# Patient Record
Sex: MALE
[De-identification: names, ages, dates, MRNs, and addresses within clinical notes are randomized per-mention and may not be internally consistent; named-entity substitution may affect disease eponyms.]

---

## 2023-01-11 ENCOUNTER — APPOINTMENT (OUTPATIENT)
Dept: AFTER HOURS CARE | Facility: EMERGENCY ROOM | Age: 58
End: 2023-01-11
Payer: COMMERCIAL

## 2023-01-12 DIAGNOSIS — R05.8 OTHER SPECIFIED COUGH: ICD-10-CM

## 2023-01-12 PROBLEM — Z00.00 ENCOUNTER FOR PREVENTIVE HEALTH EXAMINATION: Status: ACTIVE | Noted: 2023-01-12

## 2023-01-12 PROCEDURE — 99204 OFFICE O/P NEW MOD 45 MIN: CPT | Mod: 95

## 2023-01-12 RX ORDER — PREDNISONE 20 MG/1
20 TABLET ORAL
Qty: 14 | Refills: 0 | Status: ACTIVE | COMMUNITY
Start: 2023-01-12 | End: 1900-01-01

## 2023-01-13 NOTE — ASSESSMENT
[FreeTextEntry1] : chronic cough, possibly post-viral. after r/b discussion with pt, he will try steroids +/- the abx/ he already has on hand

## 2023-01-13 NOTE — PLAN
[With new medications prescribed] : Treat in place: with new medications prescribed [FreeTextEntry1] : rx prednisone\par take the abx\par anticipatory guidance\par close sx monitoring\par prompt f/u in person\par strict ED precautions

## 2023-01-13 NOTE — HISTORY OF PRESENT ILLNESS
[Home] : at home, [unfilled] , at the time of the visit. [Other Location: e.g. Home (Enter Location, City,State)___] : at [unfilled] [Verbal consent obtained from patient] : the patient, [unfilled] [FreeTextEntry8] : 57y M p/w 6wks dry cough, now productive of green phlegm which started during a URI (COVID and flu neg), with\par coughing fits when he wakes up in the morning. Got abx today from PMD but hasn’t taken it. No fever, but felt\par malaise today when his cough started to have sputum production. No SOB, FAUSTIN, PND, CP, n/v/d.